# Patient Record
Sex: MALE | Race: WHITE
[De-identification: names, ages, dates, MRNs, and addresses within clinical notes are randomized per-mention and may not be internally consistent; named-entity substitution may affect disease eponyms.]

---

## 2020-08-29 ENCOUNTER — HOSPITAL ENCOUNTER (EMERGENCY)
Dept: HOSPITAL 61 - ER | Age: 30
Discharge: HOME | End: 2020-08-29
Payer: SELF-PAY

## 2020-08-29 VITALS — HEIGHT: 74 IN | BODY MASS INDEX: 19.3 KG/M2 | WEIGHT: 150.36 LBS

## 2020-08-29 VITALS — SYSTOLIC BLOOD PRESSURE: 110 MMHG | DIASTOLIC BLOOD PRESSURE: 67 MMHG
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
## 2020-08-29 DIAGNOSIS — S30.811A: Primary | ICD-10-CM

## 2020-08-29 DIAGNOSIS — M25.522: ICD-10-CM
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
## 2020-08-29 DIAGNOSIS — M79.10: ICD-10-CM

## 2020-08-29 PROCEDURE — 90471 IMMUNIZATION ADMIN: CPT

## 2020-08-29 PROCEDURE — 73080 X-RAY EXAM OF ELBOW: CPT

## 2020-08-29 PROCEDURE — 74174 CTA ABD&PLVS W/CONTRAST: CPT

## 2020-08-29 PROCEDURE — 72125 CT NECK SPINE W/O DYE: CPT

## 2020-08-29 PROCEDURE — 71275 CT ANGIOGRAPHY CHEST: CPT

## 2020-08-29 PROCEDURE — 90715 TDAP VACCINE 7 YRS/> IM: CPT

## 2020-08-29 PROCEDURE — 99285 EMERGENCY DEPT VISIT HI MDM: CPT

## 2020-08-29 PROCEDURE — 70450 CT HEAD/BRAIN W/O DYE: CPT
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
## 2022-09-16 NOTE — RAD
Adjustment disorder with mixed anxiety and depressed mood  May need to consult psychiatry   Talked to palliative and they stated that patient would not benefit from more pain medications given current emotional state    Plan:  - continue escitalopram 20mg daily  - ativan TID PRN for breakthrough anxiety   INDICATION: Reason: left elbow swelling / Spl. Instructions:  / History: 

 

COMPARISON: None.

 

IMPRESSION: 

 

Left elbow: 3 views obtained. No evidence of acute fracture or 

dislocation. There is some soft tissue swelling.

 

Electronically signed by: Eric Kaiser MD (8/29/2020 11:25 PM) 

[id number]